# Patient Record
Sex: FEMALE | Race: OTHER | NOT HISPANIC OR LATINO | Employment: FULL TIME | URBAN - METROPOLITAN AREA
[De-identification: names, ages, dates, MRNs, and addresses within clinical notes are randomized per-mention and may not be internally consistent; named-entity substitution may affect disease eponyms.]

---

## 2021-05-26 ENCOUNTER — HOSPITAL ENCOUNTER (EMERGENCY)
Facility: HOSPITAL | Age: 36
Discharge: HOME | End: 2021-05-26
Attending: EMERGENCY MEDICINE | Admitting: EMERGENCY MEDICINE
Payer: OTHER MISCELLANEOUS

## 2021-05-26 ENCOUNTER — APPOINTMENT (EMERGENCY)
Dept: RADIOLOGY | Facility: HOSPITAL | Age: 36
End: 2021-05-26
Payer: OTHER MISCELLANEOUS

## 2021-05-26 VITALS
HEIGHT: 64 IN | TEMPERATURE: 98.6 F | RESPIRATION RATE: 18 BRPM | OXYGEN SATURATION: 100 % | SYSTOLIC BLOOD PRESSURE: 116 MMHG | DIASTOLIC BLOOD PRESSURE: 61 MMHG | HEART RATE: 82 BPM

## 2021-05-26 DIAGNOSIS — S62.660B OPEN NONDISPLACED FRACTURE OF DISTAL PHALANX OF RIGHT INDEX FINGER, INITIAL ENCOUNTER: Primary | ICD-10-CM

## 2021-05-26 PROCEDURE — 73140 X-RAY EXAM OF FINGER(S): CPT

## 2021-05-26 PROCEDURE — 99284 EMERGENCY DEPT VISIT MOD MDM: CPT | Performed by: PHYSICIAN ASSISTANT

## 2021-05-26 PROCEDURE — 90715 TDAP VACCINE 7 YRS/> IM: CPT | Performed by: PHYSICIAN ASSISTANT

## 2021-05-26 PROCEDURE — 90471 IMMUNIZATION ADMIN: CPT

## 2021-05-26 PROCEDURE — 99283 EMERGENCY DEPT VISIT LOW MDM: CPT

## 2021-05-26 RX ORDER — LIDOCAINE HYDROCHLORIDE AND EPINEPHRINE 10; 10 MG/ML; UG/ML
10 INJECTION, SOLUTION INFILTRATION; PERINEURAL ONCE
Status: COMPLETED | OUTPATIENT
Start: 2021-05-26 | End: 2021-05-26

## 2021-05-26 RX ORDER — CEPHALEXIN 500 MG/1
500 CAPSULE ORAL EVERY 8 HOURS SCHEDULED
Qty: 15 CAPSULE | Refills: 0 | Status: SHIPPED | OUTPATIENT
Start: 2021-05-26 | End: 2021-05-31

## 2021-05-26 RX ADMIN — TETANUS TOXOID, REDUCED DIPHTHERIA TOXOID AND ACELLULAR PERTUSSIS VACCINE, ADSORBED 0.5 ML: 5; 2.5; 8; 8; 2.5 SUSPENSION INTRAMUSCULAR at 09:23

## 2021-05-26 RX ADMIN — LIDOCAINE HYDROCHLORIDE,EPINEPHRINE BITARTRATE 10 ML: 10; .01 INJECTION, SOLUTION INFILTRATION; PERINEURAL at 09:22

## 2021-05-26 NOTE — ED PROVIDER NOTES
History  Chief Complaint   Patient presents with    Finger Injury     pt presents to er with complaints of cutting her right first finger on a machine while at work  reports that her finger got stuck in the chain      29 yo otherwise healthy female presents to the Emergency Department for evaluation of R index fingernail avulsion sustained when she caught the nail in a chain at work  Bleeding controlled w/ pressure  Last tetanus unknown  None       History reviewed  No pertinent past medical history  Past Surgical History:   Procedure Laterality Date    TUBAL LIGATION Bilateral        History reviewed  No pertinent family history  I have reviewed and agree with the history as documented  E-Cigarette/Vaping     E-Cigarette/Vaping Substances     Social History     Tobacco Use    Smoking status: Never Smoker    Smokeless tobacco: Never Used   Substance Use Topics    Alcohol use: Not on file    Drug use: Not on file       Review of Systems   Constitutional: Negative for fever  Musculoskeletal: Negative for arthralgias, joint swelling and myalgias  Skin: Negative for color change and wound  Neurological: Negative for weakness and numbness  Physical Exam  Physical Exam  Vitals signs reviewed  Constitutional:       General: She is not in acute distress  Appearance: She is well-developed  She is not diaphoretic  HENT:      Head: Normocephalic and atraumatic  Mouth/Throat:      Mouth: Mucous membranes are moist    Eyes:      General: No scleral icterus  Pupils: Pupils are equal, round, and reactive to light  Cardiovascular:      Rate and Rhythm: Normal rate and regular rhythm  Heart sounds: No murmur  No friction rub  No gallop  Pulmonary:      Effort: No respiratory distress  Breath sounds: No wheezing or rales  Musculoskeletal:         General: Signs of injury present  Skin:     General: Skin is warm and dry        Capillary Refill: Capillary refill takes less than 2 seconds  Comments: L index fingernail complete avulsion  Minor laceration to nail bed  No digital swelling or erythema   Neurological:      General: No focal deficit present  Mental Status: She is oriented to person, place, and time  Psychiatric:         Behavior: Behavior normal          Vital Signs  ED Triage Vitals [05/26/21 0913]   Temperature Pulse Respirations Blood Pressure SpO2   98 6 °F (37 °C) 82 18 116/61 100 %      Temp Source Heart Rate Source Patient Position - Orthostatic VS BP Location FiO2 (%)   Temporal Monitor Sitting Left arm --      Pain Score       --           Vitals:    05/26/21 0913   BP: 116/61   Pulse: 82   Patient Position - Orthostatic VS: Sitting         Visual Acuity      ED Medications  Medications   lidocaine-epinephrine (XYLOCAINE/EPINEPHRINE) 1 %-1:100,000 injection 10 mL (10 mL Infiltration Given 5/26/21 0922)   tetanus-diphtheria-acellular pertussis (BOOSTRIX) IM injection 0 5 mL (0 5 mL Intramuscular Given 5/26/21 0332)       Diagnostic Studies  Results Reviewed     None                 XR finger second digit-index RIGHT   ED Interpretation by Ad Goodrich PA-C (05/26 9959)   Small distal tuft fracture                 Procedures  Procedures         ED Course                             SBIRT 20yo+      Most Recent Value   SBIRT (25 yo +)   In order to provide better care to our patients, we are screening all of our patients for alcohol and drug use  Would it be okay to ask you these screening questions? Yes Filed at: 05/26/2021 4121   Initial Alcohol Screen: US AUDIT-C    1  How often do you have a drink containing alcohol?  0 Filed at: 05/26/2021 0933   2  How many drinks containing alcohol do you have on a typical day you are drinking? 0 Filed at: 05/26/2021 0933   3a  Male UNDER 65: How often do you have five or more drinks on one occasion? 0 Filed at: 05/26/2021 0933   3b  FEMALE Any Age, or MALE 65+:  How often do you have 4 or more drinks on one occassion? 0 Filed at: 05/26/2021 0933   Audit-C Score  0 Filed at: 05/26/2021 7615   ANSLEY: How many times in the past year have you    Used an illegal drug or used a prescription medication for non-medical reasons? Never Filed at: 05/26/2021 4551                    [facility]  Number of Diagnoses or Management Options  Open nondisplaced fracture of distal phalanx of right index finger, initial encounter: new and requires workup  Diagnosis management comments: No need for primary closure of nail bed injury as wound is quite minor  Given open wound and associated tuft fracture, will start empiric abx  Tdap updated       Amount and/or Complexity of Data Reviewed  Tests in the radiology section of CPT®: ordered and reviewed  Review and summarize past medical records: yes  Independent visualization of images, tracings, or specimens: yes        Disposition  Final diagnoses:   Open nondisplaced fracture of distal phalanx of right index finger, initial encounter     Time reflects when diagnosis was documented in both MDM as applicable and the Disposition within this note     Time User Action Codes Description Comment    5/26/2021  9:49 AM Mari Monzon Add [S62 660B] Open nondisplaced fracture of distal phalanx of right index finger, initial encounter       ED Disposition     ED Disposition Condition Date/Time Comment    Discharge Stable Wed May 26, 2021  9:49 AM Robby Lisa discharge to home/self care              Follow-up Information     Follow up With Specialties Details Why Contact Info Additional Information    [address] [facility] Schedule an appointment as soon as possible for a visit in 1 week  [address] [employer] [address] [facility] [address], [address] [employer] [address]          Discharge Medication List as of 5/26/2021  9:51 AM      START taking these medications    Details   cephalexin (KEFLEX) 500 mg capsule Take 1 capsule (500 mg total) by mouth every 8 (eight) hours for 5 days, Starting Wed 5/26/2021, Until Mon 5/31/2021, Normal           No discharge procedures on file      PDMP Review     None          ED Provider  Electronically Signed by           Jay Santos PA-C  05/26/21 5899

## 2023-09-18 ENCOUNTER — HOSPITAL ENCOUNTER (EMERGENCY)
Facility: HOSPITAL | Age: 38
Discharge: HOME | End: 2023-09-19
Attending: EMERGENCY MEDICINE

## 2023-09-18 ENCOUNTER — APPOINTMENT (EMERGENCY)
Dept: CT IMAGING | Facility: HOSPITAL | Age: 38
End: 2023-09-18

## 2023-09-18 DIAGNOSIS — N12 PYELONEPHRITIS: Primary | ICD-10-CM

## 2023-09-18 DIAGNOSIS — R74.01 TRANSAMINITIS: ICD-10-CM

## 2023-09-18 DIAGNOSIS — N28.89 URETERITIS: ICD-10-CM

## 2023-09-18 LAB
ALBUMIN SERPL BCP-MCNC: 4.1 G/DL (ref 3.5–5)
ALP SERPL-CCNC: 107 U/L (ref 34–104)
ALT SERPL W P-5'-P-CCNC: 89 U/L (ref 7–52)
ANION GAP SERPL CALCULATED.3IONS-SCNC: 9 MMOL/L
APTT PPP: 26 SECONDS (ref 23–37)
AST SERPL W P-5'-P-CCNC: 56 U/L (ref 13–39)
BACTERIA UR QL AUTO: ABNORMAL /HPF
BASOPHILS # BLD AUTO: 0.03 THOUSANDS/ÂΜL (ref 0–0.1)
BASOPHILS NFR BLD AUTO: 0 % (ref 0–1)
BILIRUB SERPL-MCNC: 0.64 MG/DL (ref 0.2–1)
BILIRUB UR QL STRIP: NEGATIVE
BUN SERPL-MCNC: 7 MG/DL (ref 5–25)
CALCIUM SERPL-MCNC: 9.2 MG/DL (ref 8.4–10.2)
CHLORIDE SERPL-SCNC: 101 MMOL/L (ref 96–108)
CLARITY UR: ABNORMAL
CO2 SERPL-SCNC: 23 MMOL/L (ref 21–32)
COLOR UR: YELLOW
CREAT SERPL-MCNC: 0.61 MG/DL (ref 0.6–1.3)
EOSINOPHIL # BLD AUTO: 0.01 THOUSAND/ÂΜL (ref 0–0.61)
EOSINOPHIL NFR BLD AUTO: 0 % (ref 0–6)
ERYTHROCYTE [DISTWIDTH] IN BLOOD BY AUTOMATED COUNT: 14.6 % (ref 11.6–15.1)
GFR SERPL CREATININE-BSD FRML MDRD: 117 ML/MIN/1.73SQ M
GLUCOSE SERPL-MCNC: 90 MG/DL (ref 65–140)
GLUCOSE UR STRIP-MCNC: NEGATIVE MG/DL
HCG SERPL QL: NEGATIVE
HCT VFR BLD AUTO: 34.9 % (ref 34.8–46.1)
HGB BLD-MCNC: 11.1 G/DL (ref 11.5–15.4)
HGB UR QL STRIP.AUTO: ABNORMAL
IMM GRANULOCYTES # BLD AUTO: 0.04 THOUSAND/UL (ref 0–0.2)
IMM GRANULOCYTES NFR BLD AUTO: 0 % (ref 0–2)
INR PPP: 1.04 (ref 0.84–1.19)
KETONES UR STRIP-MCNC: ABNORMAL MG/DL
LACTATE SERPL-SCNC: 0.6 MMOL/L (ref 0.5–2)
LEUKOCYTE ESTERASE UR QL STRIP: ABNORMAL
LIPASE SERPL-CCNC: 16 U/L (ref 11–82)
LYMPHOCYTES # BLD AUTO: 1.41 THOUSANDS/ÂΜL (ref 0.6–4.47)
LYMPHOCYTES NFR BLD AUTO: 16 % (ref 14–44)
MCH RBC QN AUTO: 28.2 PG (ref 26.8–34.3)
MCHC RBC AUTO-ENTMCNC: 31.8 G/DL (ref 31.4–37.4)
MCV RBC AUTO: 89 FL (ref 82–98)
MONOCYTES # BLD AUTO: 0.93 THOUSAND/ÂΜL (ref 0.17–1.22)
MONOCYTES NFR BLD AUTO: 10 % (ref 4–12)
NEUTROPHILS # BLD AUTO: 6.56 THOUSANDS/ÂΜL (ref 1.85–7.62)
NEUTS SEG NFR BLD AUTO: 74 % (ref 43–75)
NITRITE UR QL STRIP: POSITIVE
NON-SQ EPI CELLS URNS QL MICRO: ABNORMAL /HPF
NRBC BLD AUTO-RTO: 0 /100 WBCS
PH UR STRIP.AUTO: 6 [PH]
PLATELET # BLD AUTO: 189 THOUSANDS/UL (ref 149–390)
PMV BLD AUTO: 10.5 FL (ref 8.9–12.7)
POTASSIUM SERPL-SCNC: 3.3 MMOL/L (ref 3.5–5.3)
PROCALCITONIN SERPL-MCNC: 0.13 NG/ML
PROT SERPL-MCNC: 8.4 G/DL (ref 6.4–8.4)
PROT UR STRIP-MCNC: ABNORMAL MG/DL
PROTHROMBIN TIME: 14.3 SECONDS (ref 11.6–14.5)
RBC # BLD AUTO: 3.93 MILLION/UL (ref 3.81–5.12)
RBC #/AREA URNS AUTO: ABNORMAL /HPF
SODIUM SERPL-SCNC: 133 MMOL/L (ref 135–147)
SP GR UR STRIP.AUTO: <1.005 (ref 1–1.03)
UROBILINOGEN UR STRIP-ACNC: <2 MG/DL
WBC # BLD AUTO: 8.98 THOUSAND/UL (ref 4.31–10.16)
WBC #/AREA URNS AUTO: ABNORMAL /HPF

## 2023-09-18 PROCEDURE — 96361 HYDRATE IV INFUSION ADD-ON: CPT

## 2023-09-18 PROCEDURE — 87040 BLOOD CULTURE FOR BACTERIA: CPT | Performed by: EMERGENCY MEDICINE

## 2023-09-18 PROCEDURE — 85025 COMPLETE CBC W/AUTO DIFF WBC: CPT | Performed by: EMERGENCY MEDICINE

## 2023-09-18 PROCEDURE — 87186 SC STD MICRODIL/AGAR DIL: CPT | Performed by: EMERGENCY MEDICINE

## 2023-09-18 PROCEDURE — 80053 COMPREHEN METABOLIC PANEL: CPT | Performed by: EMERGENCY MEDICINE

## 2023-09-18 PROCEDURE — 85730 THROMBOPLASTIN TIME PARTIAL: CPT | Performed by: EMERGENCY MEDICINE

## 2023-09-18 PROCEDURE — 36415 COLL VENOUS BLD VENIPUNCTURE: CPT

## 2023-09-18 PROCEDURE — 87077 CULTURE AEROBIC IDENTIFY: CPT | Performed by: EMERGENCY MEDICINE

## 2023-09-18 PROCEDURE — 84703 CHORIONIC GONADOTROPIN ASSAY: CPT | Performed by: EMERGENCY MEDICINE

## 2023-09-18 PROCEDURE — 96365 THER/PROPH/DIAG IV INF INIT: CPT

## 2023-09-18 PROCEDURE — 84145 PROCALCITONIN (PCT): CPT | Performed by: EMERGENCY MEDICINE

## 2023-09-18 PROCEDURE — 81001 URINALYSIS AUTO W/SCOPE: CPT | Performed by: EMERGENCY MEDICINE

## 2023-09-18 PROCEDURE — 74177 CT ABD & PELVIS W/CONTRAST: CPT

## 2023-09-18 PROCEDURE — 83690 ASSAY OF LIPASE: CPT | Performed by: EMERGENCY MEDICINE

## 2023-09-18 PROCEDURE — 96375 TX/PRO/DX INJ NEW DRUG ADDON: CPT

## 2023-09-18 PROCEDURE — 87086 URINE CULTURE/COLONY COUNT: CPT | Performed by: EMERGENCY MEDICINE

## 2023-09-18 PROCEDURE — 99283 EMERGENCY DEPT VISIT LOW MDM: CPT

## 2023-09-18 PROCEDURE — 83605 ASSAY OF LACTIC ACID: CPT | Performed by: EMERGENCY MEDICINE

## 2023-09-18 PROCEDURE — 85610 PROTHROMBIN TIME: CPT | Performed by: EMERGENCY MEDICINE

## 2023-09-18 RX ORDER — CEFDINIR 300 MG/1
300 CAPSULE ORAL EVERY 12 HOURS SCHEDULED
Qty: 20 CAPSULE | Refills: 0 | Status: SHIPPED | OUTPATIENT
Start: 2023-09-18 | End: 2023-09-28

## 2023-09-18 RX ORDER — KETOROLAC TROMETHAMINE 30 MG/ML
15 INJECTION, SOLUTION INTRAMUSCULAR; INTRAVENOUS ONCE
Status: COMPLETED | OUTPATIENT
Start: 2023-09-18 | End: 2023-09-18

## 2023-09-18 RX ORDER — CEFTRIAXONE 1 G/50ML
1000 INJECTION, SOLUTION INTRAVENOUS ONCE
Status: COMPLETED | OUTPATIENT
Start: 2023-09-18 | End: 2023-09-18

## 2023-09-18 RX ORDER — ONDANSETRON 4 MG/1
4 TABLET, ORALLY DISINTEGRATING ORAL EVERY 6 HOURS PRN
Qty: 20 TABLET | Refills: 0 | Status: SHIPPED | OUTPATIENT
Start: 2023-09-18

## 2023-09-18 RX ADMIN — CEFTRIAXONE 1000 MG: 1 INJECTION, SOLUTION INTRAVENOUS at 23:24

## 2023-09-18 RX ADMIN — IOHEXOL 100 ML: 350 INJECTION, SOLUTION INTRAVENOUS at 22:38

## 2023-09-18 RX ADMIN — KETOROLAC TROMETHAMINE 15 MG: 30 INJECTION INTRAMUSCULAR; INTRAVENOUS at 21:38

## 2023-09-18 RX ADMIN — SODIUM CHLORIDE 1000 ML: 0.9 INJECTION, SOLUTION INTRAVENOUS at 21:39

## 2023-09-18 NOTE — Clinical Note
Estefany Valenzuela was seen and treated in our emergency department on 9/18/2023. Diagnosis:     Lavern Joy  may return to work on return date. She may return on this date: 09/22/2023         If you have any questions or concerns, please don't hesitate to call.       Joe Goodwin, DO    ______________________________           _______________          _______________  Hospital Representative                              Date                                Time

## 2023-09-19 VITALS
TEMPERATURE: 100.7 F | RESPIRATION RATE: 15 BRPM | DIASTOLIC BLOOD PRESSURE: 54 MMHG | OXYGEN SATURATION: 96 % | SYSTOLIC BLOOD PRESSURE: 95 MMHG | HEART RATE: 91 BPM

## 2023-09-19 NOTE — ED PROVIDER NOTES
History  Chief Complaint   Patient presents with   • Back Pain     Seen at [facility] for back pain and UTI     70-year-old female presents for evaluation of suprapubic and bilateral flank pain that started 1 week ago. Initially evaluated at urgent care and found to be febrile and tachycardic and advised to come to the emergency department. None       History reviewed. No pertinent past medical history. Past Surgical History:   Procedure Laterality Date   • TUBAL LIGATION Bilateral        History reviewed. No pertinent family history. I have reviewed and agree with the history as documented. E-Cigarette/Vaping   • E-Cigarette Use Never User      E-Cigarette/Vaping Substances     Social History     Tobacco Use   • Smoking status: Never   • Smokeless tobacco: Never   Vaping Use   • Vaping Use: Never used   Substance Use Topics   • Alcohol use: Never   • Drug use: Never       Review of Systems   Constitutional: Positive for fever. Gastrointestinal: Positive for abdominal pain. Genitourinary: Positive for flank pain. Physical Exam  Physical Exam  Vitals and nursing note reviewed. Constitutional:       Appearance: She is well-developed. HENT:      Head: Normocephalic and atraumatic. Right Ear: External ear normal.      Left Ear: External ear normal.      Nose: Nose normal.   Eyes:      General: No scleral icterus. Cardiovascular:      Rate and Rhythm: Normal rate. Pulmonary:      Effort: Pulmonary effort is normal. No respiratory distress. Abdominal:      General: There is no distension. Tenderness: There is abdominal tenderness. Comments: Tender in suprapubic region   Musculoskeletal:         General: No deformity. Normal range of motion. Cervical back: Normal range of motion. Skin:     Findings: No rash. Neurological:      General: No focal deficit present. Mental Status: She is alert and oriented to person, place, and time.    Psychiatric:         Mood and Affect: Mood normal.         Vital Signs  ED Triage Vitals [09/18/23 1841]   Temperature Pulse Respirations Blood Pressure SpO2   (!) 100.7 °F (38.2 °C) 105 16 120/66 99 %      Temp Source Heart Rate Source Patient Position - Orthostatic VS BP Location FiO2 (%)   Oral Monitor Sitting Left arm --      Pain Score       5           Vitals:    09/18/23 2235 09/18/23 2300 09/18/23 2330 09/19/23 0000   BP: 103/53 101/55 91/52 95/54   Pulse: (!) 106 86 85 91   Patient Position - Orthostatic VS:             Visual Acuity      ED Medications  Medications   ketorolac (TORADOL) injection 15 mg (15 mg Intravenous Given 9/18/23 2138)   sodium chloride 0.9 % bolus 1,000 mL (0 mL Intravenous Stopped 9/18/23 2239)   iohexol (OMNIPAQUE) 350 MG/ML injection (MULTI-DOSE) 100 mL (100 mL Intravenous Given 9/18/23 2238)   cefTRIAXone (ROCEPHIN) IVPB (premix in dextrose) 1,000 mg 50 mL (0 mg Intravenous Stopped 9/18/23 2354)       Diagnostic Studies  Results Reviewed     Procedure Component Value Units Date/Time    Urine Microscopic [[id number]]  (Abnormal) Collected: 09/18/23 2246    Lab Status: Final result Specimen: Urine, Clean Catch Updated: 09/18/23 2306     RBC, UA 0-1 /hpf      WBC, UA 20-30 /hpf      Epithelial Cells None Seen /hpf      Bacteria, UA Moderate /hpf     Urine culture [[id number]] Collected: 09/18/23 2246    Lab Status:  In process Specimen: Urine, Clean Catch Updated: 09/18/23 2305    UA w Reflex to Microscopic w Reflex to Culture [[id number]]  (Abnormal) Collected: 09/18/23 2246    Lab Status: Final result Specimen: Urine, Clean Catch Updated: 09/18/23 2305     Color, UA Yellow     Clarity, UA Cloudy     Specific Gravity, UA <1.005     pH, UA 6.0     Leukocytes, UA Large     Nitrite, UA Positive     Protein, UA 30 (1+) mg/dl      Glucose, UA Negative mg/dl      Ketones, UA 10 (1+) mg/dl      Urobilinogen, UA <2.0 mg/dl      Bilirubin, UA Negative     Occult Blood, UA Moderate    Procalcitonin [[id number]]  (Normal) Collected: 09/18/23 1848    Lab Status: Final result Specimen: Blood from Arm, Right Updated: 09/18/23 1933     Procalcitonin 0.13 ng/ml     hCG, qualitative pregnancy [[id number]]  (Normal) Collected: 09/18/23 1848    Lab Status: Final result Specimen: Blood from Arm, Right Updated: 09/18/23 1931     Preg, Serum Negative    Comprehensive metabolic panel [[id number]]  (Abnormal) Collected: 09/18/23 1848    Lab Status: Final result Specimen: Blood from Arm, Right Updated: 09/18/23 1924     Sodium 133 mmol/L      Potassium 3.3 mmol/L      Chloride 101 mmol/L      CO2 23 mmol/L      ANION GAP 9 mmol/L      BUN 7 mg/dL      Creatinine 0.61 mg/dL      Glucose 90 mg/dL      Calcium 9.2 mg/dL      AST 56 U/L      ALT 89 U/L      Alkaline Phosphatase 107 U/L      Total Protein 8.4 g/dL      Albumin 4.1 g/dL      Total Bilirubin 0.64 mg/dL      eGFR 117 ml/min/1.73sq m     Narrative:      [place] guidelines for Chronic Kidney Disease (CKD):   •  Stage 1 with normal or high GFR (GFR > 90 mL/min/1.73 square meters)  •  Stage 2 Mild CKD (GFR = 60-89 mL/min/1.73 square meters)  •  Stage 3A Moderate CKD (GFR = 45-59 mL/min/1.73 square meters)  •  Stage 3B Moderate CKD (GFR = 30-44 mL/min/1.73 square meters)  •  Stage 4 Severe CKD (GFR = 15-29 mL/min/1.73 square meters)  •  Stage 5 End Stage CKD (GFR <15 mL/min/1.73 square meters)  Note: GFR calculation is accurate only with a steady state creatinine    Lipase [[id number]]  (Normal) Collected: 09/18/23 1848    Lab Status: Final result Specimen: Blood from Arm, Right Updated: 09/18/23 1924     Lipase 16 u/L     Lactic acid, plasma (w/reflex if result > 2.0) [[id number]]  (Normal) Collected: 09/18/23 1848    Lab Status: Final result Specimen: Blood from Arm, Right Updated: 09/18/23 1922     LACTIC ACID 0.6 mmol/L     Narrative:      Result may be elevated if tourniquet was used during collection.     APTT [[id number]]  (Normal) Collected: 09/18/23 1848    Lab Status: Final result Specimen: Blood from Arm, Right Updated: 09/18/23 1918     PTT 26 seconds     Protime-INR [[id number]]  (Normal) Collected: 09/18/23 1848    Lab Status: Final result Specimen: Blood from Arm, Right Updated: 09/18/23 1918     Protime 14.3 seconds      INR 1.04    CBC and differential [[id number]]  (Abnormal) Collected: 09/18/23 1848    Lab Status: Final result Specimen: Blood from Arm, Right Updated: 09/18/23 1902     WBC 8.98 Thousand/uL      RBC 3.93 Million/uL      Hemoglobin 11.1 g/dL      Hematocrit 34.9 %      MCV 89 fL      MCH 28.2 pg      MCHC 31.8 g/dL      RDW 14.6 %      MPV 10.5 fL      Platelets 642 Thousands/uL      nRBC 0 /100 WBCs      Neutrophils Relative 74 %      Immat GRANS % 0 %      Lymphocytes Relative 16 %      Monocytes Relative 10 %      Eosinophils Relative 0 %      Basophils Relative 0 %      Neutrophils Absolute 6.56 Thousands/µL      Immature Grans Absolute 0.04 Thousand/uL      Lymphocytes Absolute 1.41 Thousands/µL      Monocytes Absolute 0.93 Thousand/µL      Eosinophils Absolute 0.01 Thousand/µL      Basophils Absolute 0.03 Thousands/µL     Blood culture #1 [[id number]] Collected: 09/18/23 1848    Lab Status: In process Specimen: Blood from Arm, Right Updated: 09/18/23 1859    Blood culture #2 [[id number]] Collected: 09/18/23 1848    Lab Status: No result Specimen: Blood from Arm, Right                  CT abdomen pelvis with contrast   Final Result by Nickie Lees MD (09/18 2341)      Striated left nephrogram compatible pyelonephritis. Enhancing ureters concerning for ureteritis. .               Workstation performed: [id number]                    Procedures  Procedures         ED Course                               SBIRT 22yo+    Flowsheet Row Most Recent Value   Initial Alcohol Screen: US AUDIT-C     1. How often do you have a drink containing alcohol? 0 Filed at: 09/18/2023 2240   2.  How many drinks containing alcohol do you have on a typical day you are drinking? 0 Filed at: 09/18/2023 2240   3a. Male UNDER 65: How often do you have five or more drinks on one occasion? 0 Filed at: 09/18/2023 2240   3b. FEMALE Any Age, or MALE 65+: How often do you have 4 or more drinks on one occassion? 0 Filed at: 09/18/2023 2240   Audit-C Score 0 Filed at: 09/18/2023 2240   ANSLEY: How many times in the past year have you. .. Used an illegal drug or used a prescription medication for non-medical reasons? Never Filed at: 09/18/2023 2240                    Medical Decision Making  67-year-old female with lower abdominal pain found to be. Differential diagnosis includes but not limited to UTI, PID, kidney stone. Sepsis evaluation completed with triage protocol. We will also obtain CT abdomen pelvis. Symptom control and reassess. Discussed all results with patient. Reports significant improvement of symptoms. Offered admission but patient declined and states she would like to trial outpatient management. Will return if symptoms worsen. Pyelonephritis: acute illness or injury  Amount and/or Complexity of Data Reviewed  Independent Historian: friend  External Data Reviewed: labs and radiology. Labs: ordered. Radiology: ordered. Risk  Prescription drug management. Disposition  Final diagnoses:   Pyelonephritis   Ureteritis   Transaminitis     Time reflects when diagnosis was documented in both MDM as applicable and the Disposition within this note     Time User Action Codes Description Comment    9/18/2023 11:57 PM Jose Francisco Gibson Add [N12] Pyelonephritis     9/18/2023 11:58 PM Jose Francisco Gibson Add [N28.89] Ureteritis     9/18/2023 11:58 PM Jose Francisco Gibson Add [R74.01] Transaminitis       ED Disposition     ED Disposition   Discharge    Condition   Stable    Date/Time   Mon Sep 18, 2023 11:57 PM    Comment   Shad Tam discharge to home/self care.                Follow-up Information     Follow up With Specialties Details Why Contact Info Additional Information Your primary care provider          [address] [facility] Emergency Medicine  If symptoms worsen [address] [facility] [phone]  [address] [facility], [address] [facility], [facility], [address]          Discharge Medication List as of 9/18/2023 11:59 PM      START taking these medications    Details   cefdinir (OMNICEF) 300 mg capsule Take 1 capsule (300 mg total) by mouth every 12 (twelve) hours for 10 days, Starting Mon 9/18/2023, Until Thu 9/28/2023, Normal      ondansetron (Zofran ODT) 4 mg disintegrating tablet Take 1 tablet (4 mg total) by mouth every 6 (six) hours as needed for nausea or vomiting, Starting Mon 9/18/2023, Normal             No discharge procedures on file.     PDMP Review     None          ED Provider  Electronically Signed by           John Ac DO  09/19/23 0012

## 2023-09-21 LAB — BACTERIA UR CULT: ABNORMAL

## 2023-09-24 LAB — BACTERIA BLD CULT: NORMAL

## 2025-06-13 ENCOUNTER — OFFICE VISIT (OUTPATIENT)
Dept: OBGYN CLINIC | Facility: CLINIC | Age: 40
End: 2025-06-13

## 2025-06-13 VITALS — DIASTOLIC BLOOD PRESSURE: 72 MMHG | WEIGHT: 159 LBS | BODY MASS INDEX: 27.29 KG/M2 | SYSTOLIC BLOOD PRESSURE: 114 MMHG

## 2025-06-13 DIAGNOSIS — Z12.39 ENCOUNTER FOR BREAST CANCER SCREENING USING NON-MAMMOGRAM MODALITY: ICD-10-CM

## 2025-06-13 DIAGNOSIS — Z23 NEED FOR HPV VACCINATION: ICD-10-CM

## 2025-06-13 DIAGNOSIS — Z01.419 ENCOUNTER FOR GYNECOLOGICAL EXAMINATION WITHOUT ABNORMAL FINDING: Primary | ICD-10-CM

## 2025-06-13 DIAGNOSIS — Z12.4 SCREENING FOR CERVICAL CANCER: ICD-10-CM

## 2025-06-13 DIAGNOSIS — Z11.51 SCREENING FOR HPV (HUMAN PAPILLOMAVIRUS): ICD-10-CM

## 2025-06-13 PROCEDURE — 90651 9VHPV VACCINE 2/3 DOSE IM: CPT | Performed by: NURSE PRACTITIONER

## 2025-06-13 PROCEDURE — S0610 ANNUAL GYNECOLOGICAL EXAMINA: HCPCS | Performed by: NURSE PRACTITIONER

## 2025-06-13 PROCEDURE — 90471 IMMUNIZATION ADMIN: CPT | Performed by: NURSE PRACTITIONER

## 2025-06-13 PROCEDURE — G0145 SCR C/V CYTO,THINLAYER,RESCR: HCPCS | Performed by: NURSE PRACTITIONER

## 2025-06-13 PROCEDURE — G0476 HPV COMBO ASSAY CA SCREEN: HCPCS | Performed by: NURSE PRACTITIONER

## 2025-06-13 NOTE — PATIENT INSTRUCTIONS
Katherine por escalona confianza en nuestro equipo.   Le agradecemos y agradecemos yasmin comentarios.   Si recibe deborah encuesta nuestra, tómese unos momentos para informarnos cómo estamos.   Sinceramente,  EMILY Baez

## 2025-06-13 NOTE — PROGRESS NOTES
ANNUAL GYNECOLOGICAL EXAMINATION    Cass Sanchez is a 39 y.o. female who presents today for annual GYN exam.  Her last pap smear was performed 12 years ago and result was WNL per patint.  She reports no history of abnormal pap smears in her past.  I have no documentation of HIV screening in her past.  She reports menses as regular.  Patient's last menstrual period was 2025 (within days).  Her general medical history has been reviewed and she reports it as follows:    Past Medical History:   Diagnosis Date   • No known health problems      Past Surgical History:   Procedure Laterality Date   • TUBAL LIGATION Bilateral      OB History          2    Para   2    Term   2       0    AB   0    Living   2         SAB   0    IAB   0    Ectopic   0    Multiple   0    Live Births   2               Social History     Tobacco Use   • Smoking status: Former     Types: Cigarettes   • Smokeless tobacco: Never   • Tobacco comments:     Quit    Vaping Use   • Vaping status: Never Used   Substance Use Topics   • Alcohol use: Yes     Comment: couple times/month   • Drug use: Never     Social History     Substance and Sexual Activity   Sexual Activity Yes   • Partners: Male   • Birth control/protection: Female Sterilization     Cancer-related family history is negative for Ovarian cancer, Breast cancer, Colon cancer, and Cancer.    No current outpatient medications    Review of Systems:  Review of Systems   Constitutional: Negative.    Gastrointestinal: Negative.    Genitourinary:  Negative for difficulty urinating, menstrual problem, pelvic pain and vaginal discharge.   Skin: Negative.        Physical Exam:  /72 (Patient Position: Sitting, Cuff Size: Standard)   Wt 72.1 kg (159 lb)   LMP 2025 (Within Days)   BMI 27.29 kg/m²   Physical Exam  Constitutional:       General: She is not in acute distress.     Appearance: She is well-developed.   Genitourinary:      Vulva normal.      No lesions  in the vagina.        Right Adnexa: not tender and no mass present.     Left Adnexa: not tender and no mass present.     No cervical motion tenderness or lesion.      Uterus is not tender.   Breasts:     Right: No mass, nipple discharge, skin change or tenderness.      Left: No mass, nipple discharge, skin change or tenderness.   Neck:      Thyroid: No thyromegaly.     Cardiovascular:      Rate and Rhythm: Normal rate and regular rhythm.   Pulmonary:      Effort: Pulmonary effort is normal.   Abdominal:      Palpations: Abdomen is soft.      Tenderness: There is no abdominal tenderness.     Musculoskeletal:      Cervical back: Neck supple.     Neurological:      Mental Status: She is alert and oriented to person, place, and time.     Skin:     General: Skin is warm and dry.   Vitals reviewed.     Assessment/Plan:   1. Normal well-woman GYN exam.  2. Cervical cancer screening:  Normal cervical exam.  Pap smear done with HPV co-testing.  Has not received HPV vaccine in the past, but she desires to initiate vaccine series now.  Given HPV vaccine today and she will return in 2 and 6 months for subsequent vaccinations.     3. STD screening:  Patient declines.   4. Breast cancer screening:  Normal breast exam.  Reviewed breast self-awareness.   5. Depression Screening: Patient's depression screening was assessed with a PHQ-2 score of 0. Clinically patient does not have depression. No treatment is required.   6. BMI Counseling: Body mass index is 27.29 kg/m².  No intervention indicated.   7. Contraception:  BTL.   8. Return to office in 1 year for annual GYN exam.    Reviewed with patient that [facility], but that they will not necessarily be reviewed by me immediately.  Explained that I will review results at my earliest opportunity and contact patient appropriately.

## 2025-06-13 NOTE — LETTER
2025    To Cass Sanchez  : 1985      Esta carta es para informarle que yasmin resultados recientes de la prueba de Papanicolaou fueron revisados por mí y son NORMALES.  Comuníquese con la oficina para deborah devang si tiene alguna inquietud adicional.    EMILY Baez

## 2025-06-16 LAB
HPV HR 12 DNA CVX QL NAA+PROBE: NEGATIVE
HPV16 DNA CVX QL NAA+PROBE: NEGATIVE
HPV18 DNA CVX QL NAA+PROBE: NEGATIVE

## 2025-06-20 LAB
LAB AP GYN PRIMARY INTERPRETATION: NORMAL
Lab: NORMAL

## 2025-08-14 ENCOUNTER — CLINICAL SUPPORT (OUTPATIENT)
Dept: OBGYN CLINIC | Facility: CLINIC | Age: 40
End: 2025-08-14